# Patient Record
Sex: FEMALE | Race: WHITE | NOT HISPANIC OR LATINO | Employment: FULL TIME | URBAN - METROPOLITAN AREA
[De-identification: names, ages, dates, MRNs, and addresses within clinical notes are randomized per-mention and may not be internally consistent; named-entity substitution may affect disease eponyms.]

---

## 2017-12-28 ENCOUNTER — HOSPITAL ENCOUNTER (OUTPATIENT)
Dept: MAMMOGRAPHY | Facility: CLINIC | Age: 47
Discharge: HOME | End: 2017-12-28
Attending: OBSTETRICS & GYNECOLOGY | Admitting: OBSTETRICS & GYNECOLOGY
Payer: COMMERCIAL

## 2017-12-28 DIAGNOSIS — Z12.31 VISIT FOR SCREENING MAMMOGRAM: ICD-10-CM

## 2017-12-28 PROCEDURE — G0202 SCR MAMMO BI INCL CAD: HCPCS

## 2018-03-08 ENCOUNTER — APPOINTMENT (OUTPATIENT)
Dept: GENERAL RADIOLOGY | Facility: CLINIC | Age: 48
End: 2018-03-08
Attending: EMERGENCY MEDICINE
Payer: COMMERCIAL

## 2018-03-08 ENCOUNTER — HOSPITAL ENCOUNTER (EMERGENCY)
Facility: CLINIC | Age: 48
Discharge: HOME | End: 2018-03-08
Attending: EMERGENCY MEDICINE | Admitting: EMERGENCY MEDICINE
Payer: COMMERCIAL

## 2018-03-08 VITALS
HEART RATE: 65 BPM | HEIGHT: 68 IN | OXYGEN SATURATION: 100 % | DIASTOLIC BLOOD PRESSURE: 67 MMHG | TEMPERATURE: 97.8 F | RESPIRATION RATE: 20 BRPM | SYSTOLIC BLOOD PRESSURE: 167 MMHG | WEIGHT: 198.85 LBS | BODY MASS INDEX: 30.14 KG/M2

## 2018-03-08 DIAGNOSIS — R09.82 POST-NASAL DRIP: ICD-10-CM

## 2018-03-08 PROCEDURE — 71046 X-RAY EXAM CHEST 2 VIEWS: CPT

## 2018-03-08 PROCEDURE — 25000131 ZZH RX MED GY IP 250 OP 636 PS 637: Performed by: EMERGENCY MEDICINE

## 2018-03-08 PROCEDURE — 99283 EMERGENCY DEPT VISIT LOW MDM: CPT

## 2018-03-08 RX ORDER — CODEINE PHOSPHATE AND GUAIFENESIN 10; 100 MG/5ML; MG/5ML
1 SOLUTION ORAL EVERY 4 HOURS PRN
Qty: 120 ML | Refills: 0 | Status: SHIPPED | OUTPATIENT
Start: 2018-03-08

## 2018-03-08 RX ADMIN — DEXAMETHASONE 10 MG: 2 TABLET ORAL at 20:24

## 2018-03-08 ASSESSMENT — ENCOUNTER SYMPTOMS
SHORTNESS OF BREATH: 1
COUGH: 1
FEVER: 0
VOMITING: 0
ABDOMINAL PAIN: 0
RHINORRHEA: 1

## 2018-03-08 NOTE — ED AVS SNAPSHOT
Emergency Department    [address] [place] [address]    Phone:  [phone]    Fax:  [phone]                                       Lisa Borden   MRN: [id number]    Department:   Emergency Department   Date of Visit:  3/8/2018           After Visit Summary Signature Page     I have received my discharge instructions, and my questions have been answered. I have discussed any challenges I see with this plan with the nurse or doctor.    ..........................................................................................................................................  Patient/Patient Representative Signature      ..........................................................................................................................................  Patient Representative Print Name and Relationship to Patient    ..................................................               ................................................  Date                                            Time    ..........................................................................................................................................  Reviewed by Signature/Title    ...................................................              ..............................................  Date                                                            Time

## 2018-03-08 NOTE — ED AVS SNAPSHOT
Emergency Department    [address] [facility] [address]    Phone:  [phone]    Fax:  [phone]                                       Lisa Borden   MRN: [id number]    Department:   Emergency Department   Date of Visit:  3/8/2018           Patient Information     Date Of Birth          1970        Your diagnoses for this visit were:     Post-nasal drip        You were seen by Shiv Mary MD.      Follow-up Information     Follow up with Kelechi Wright MD In 3 days.    Specialty:  Family Practice    Contact information:    [facility]  [address]  [facility] [address]  [phone]          Follow up with  Emergency Department.    Specialty:  EMERGENCY MEDICINE    Why:  As needed, If symptoms worsen    Contact information:    [address] [facility] [address] [phone]        Discharge Instructions            * VIRAL RESPIRATORY ILLNESS w/ Wheezing [Adult]  You have an Upper Respiratory Illness (URI) caused by a virus. This illness is contagious during the first few days. It is spread through the air by coughing and sneezing or by direct contact (touching the sick person and then touching your own eyes, nose or mouth). When the infection causes a lot of irritation, the air passages can go into spasm. This causes wheezing and shortness of breath.    Most viral illnesses resolve within 7-10 days with rest and simple home remedies, although the illness may sometimes last for several weeks. Antibiotics will not kill a virus and are generally not prescribed for this condition.  HOME CARE:    If symptoms are severe, rest at home for the first 2-3 days. When resuming activity, don't let yourself become overly tired.    Avoid exposure to cigarette smoke (yours or others).    You may use acetaminophen (Tylenol) 650-1000 mg every 6 hours or ibuprofen (Motrin, Advil) 600 mg every 6-8 hours with food to control pain, if you are able to take these  medicines. [ NOTE : If you have chronic liver or kidney disease or ever had a stomach ulcer or GI bleeding, talk with your doctor before using these medicines.] (Aspirin should never be used in anyone under 18 years of age who is ill with a fever. It may cause severe liver damage.    Your appetite may be poor so a light diet is fine. Avoid dehydration by drinking 6-8 glasses of fluids per day (water, soft drinks, juices, tea, soup, etc.). Extra fluids will help loosen secretions in the nose and lungs.    Over-the-counter cold medicines will not shorten the duration of the illness, but may be helpful for the following symptoms: cough (Robitussin DM); sore throat (Chloraseptic lozenges or spray); nasal and sinus congestion (Actifed or Sudafed). [NOTE: Do not use decongestants if you have high blood pressure.]  FOLLOW UP with your doctor or as advised if you are not improving over the next week.  GET PROMPT MEDICAL ATTENTION if any of the following occur:    Cough with lots of colored sputum (mucus) or blood in your sputum    Chest pain, shortness of breath, wheezing or difficulty breathing    Severe headache; face, neck or ear pain    Fever over 101 F (38.3 C) for more than three days    Unable to swallow due to throat pain    5687-2500 The [employer]. [address] [place] [address], [place] [id number]. All rights reserved. This information is not intended as a substitute for professional medical care. Always follow your healthcare professional's instructions.  This information has been modified by your health care provider with permission from the publisher.      Opioid Medication Information    You have been given a prescription for an opioid (narcotic) pain medicine and/or have received a pain medicine while here in the Emergency Department. These medicines can make you drowsy or impaired. You must not drive, operate dangerous equipment, or engage in any other dangerous activities while taking these medications.  If you drive while taking these medications, you could be arrested for DUI, or driving under the influence. Do not drink any alcohol while you are taking these medications.   Opioid pain medications can cause addiction. If you have a history of chemical dependency of any type, you are at a higher risk of becoming addicted to pain medications.  Only take these prescribed medications to treat your pain when all other options have been tried. Take it for as short a time and as few doses as possible. Store your pain pills in a secure place, as they are frequently stolen and provide a dangerous opportunity for children or visitors in your house to start abusing these powerful medications. We will not replace any lost or stolen medicine.  As soon as your pain is better, you should flush all your remaining medication.   Many prescription pain medications contain Tylenol  (acetaminophen), including Vicodin , Tylenol #3 , Norco , Lortab , and Percocet .  You should not take any extra pills of Tylenol  if you are using these prescription medications or you can get very sick.  Do not ever take more than 4000 mg of acetaminophen in any 24 hour period.  All opioids tend to cause constipation. Drink plenty of water and eat foods that have a lot of fiber, such as fruits, vegetables, prune juice, apple juice and high fiber cereal.  Take a laxative if you don t move your bowels at least every other day. Miralax , Milk of Magnesia, Colace , or Senna  can be used to keep you regular.              24 Hour Appointment Hotline       To make an appointment at any [facility], call [phone] ([phone]). If you don't have a family doctor or clinic, we will help you find one. [facility] clinics are conveniently located to serve the needs of you and your family.             Review of your medicines      START taking        Dose / Directions Last dose taken    guaiFENesin-codeine 100-10 MG/5ML Soln solution   Commonly known as:   ROBITUSSIN AC   Dose:  1 tsp.   Quantity:  120 mL        Take 5 mLs by mouth every 4 hours as needed for cough   Refills:  0          Our records show that you are taking the medicines listed below. If these are incorrect, please call your family doctor or clinic.        Dose / Directions Last dose taken    IBUPROFEN PO        Refills:  0                Prescriptions were sent or printed at these locations (1 Prescription)                   Other Prescriptions                Printed at Department/Unit printer (1 of 1)         guaiFENesin-codeine (ROBITUSSIN AC) 100-10 MG/5ML SOLN solution                Procedures and tests performed during your visit     Chest XR,  PA & LAT      Orders Needing Specimen Collection     None      Pending Results     No orders found from 3/6/2018 to 3/9/2018.            Pending Culture Results     No orders found from 3/6/2018 to 3/9/2018.            Pending Results Instructions     If you had any lab results that were not finalized at the time of your Discharge, you can call the ED Lab Result RN at [phone]. You will be contacted by this team for any positive Lab results or changes in treatment. The nurses are available 7 days a week from 10A to 6:30P.  You can leave a message 24 hours per day and they will return your call.        Test Results From Your Hospital Stay        3/8/2018  8:09 PM      Narrative     CHEST TWO VIEWS     3/8/2018 7:53 PM     HISTORY: Cough.    COMPARISON: None.     FINDINGS: Cardiomediastinal silhouette within normal limits. No focal  airspace opacities. Biapical pleural thickening. No pleural effusion.  No pneumothorax identified. The bones are unremarkable.         Impression     IMPRESSION: No acute cardiopulmonary abnormalities.     TOVA MONTESINOS MD                Clinical Quality Measure: Blood Pressure Screening     Your blood pressure was checked while you were in the emergency department today. The last reading we obtained was  BP: 182/76 .  "Please read the guidelines below about what these numbers mean and what you should do about them.  If your systolic blood pressure (the top number) is less than 120 and your diastolic blood pressure (the bottom number) is less than 80, then your blood pressure is normal. There is nothing more that you need to do about it.  If your systolic blood pressure (the top number) is 120-139 or your diastolic blood pressure (the bottom number) is 80-89, your blood pressure may be higher than it should be. You should have your blood pressure rechecked within a year by a primary care provider.  If your systolic blood pressure (the top number) is 140 or greater or your diastolic blood pressure (the bottom number) is 90 or greater, you may have high blood pressure. High blood pressure is treatable, but if left untreated over time it can put you at risk for heart attack, stroke, or kidney failure. You should have your blood pressure rechecked by a primary care provider within the next 4 weeks.  If your provider in the emergency department today gave you specific instructions to follow-up with your doctor or provider even sooner than that, you should follow that instruction and not wait for up to 4 weeks for your follow-up visit.        Thank you for choosing [facility]       Thank you for choosing [facility] for your care. Our goal is always to provide you with excellent care. Hearing back from our patients is one way we can continue to improve our services. Please take a few minutes to complete the written survey that you may receive in the mail after you visit with us. Thank you!        [facility] Information     [employer] lets you send messages to your doctor, view your test results, renew your prescriptions, schedule appointments and more. To sign up, go to [email or web address] . Click on \"Log in\" on the left side of the screen, which will take you to the Welcome page. Then click on \"Sign up Now\" on the right side of the page. "     You will be asked to enter the access code listed below, as well as some personal information. Please follow the directions to create your username and password.     Your access code is: [email or web address]  Expires: 2018  8:13 PM     Your access code will  in 90 days. If you need help or a new code, please call your [place] clinic or [phone].        Care EveryWhere ID     This is your Care EveryWhere ID. This could be used by other organizations to access your [place] medical records  [id number]        Equal Access to Services     [facility]: Lizbeth Alonso, jeny huff, ninoska alexander, cecil sprague . So [facility] [phone].    ATENCIÓN: Si habla español, tiene a oseguera disposición servicios gratuitos de asistencia lingüística. Llame al [phone].    We comply with applicable federal civil rights laws and [place] laws. We do not discriminate on the basis of race, color, national origin, age, disability, sex, sexual orientation, or gender identity.            After Visit Summary       This is your record. Keep this with you and show to your community pharmacist(s) and doctor(s) at your next visit.

## 2018-03-09 NOTE — DISCHARGE INSTRUCTIONS
* VIRAL RESPIRATORY ILLNESS w/ Wheezing [Adult]  You have an Upper Respiratory Illness (URI) caused by a virus. This illness is contagious during the first few days. It is spread through the air by coughing and sneezing or by direct contact (touching the sick person and then touching your own eyes, nose or mouth). When the infection causes a lot of irritation, the air passages can go into spasm. This causes wheezing and shortness of breath.    Most viral illnesses resolve within 7-10 days with rest and simple home remedies, although the illness may sometimes last for several weeks. Antibiotics will not kill a virus and are generally not prescribed for this condition.  HOME CARE:    If symptoms are severe, rest at home for the first 2-3 days. When resuming activity, don't let yourself become overly tired.    Avoid exposure to cigarette smoke (yours or others).    You may use acetaminophen (Tylenol) 650-1000 mg every 6 hours or ibuprofen (Motrin, Advil) 600 mg every 6-8 hours with food to control pain, if you are able to take these medicines. [ NOTE : If you have chronic liver or kidney disease or ever had a stomach ulcer or GI bleeding, talk with your doctor before using these medicines.] (Aspirin should never be used in anyone under 18 years of age who is ill with a fever. It may cause severe liver damage.    Your appetite may be poor so a light diet is fine. Avoid dehydration by drinking 6-8 glasses of fluids per day (water, soft drinks, juices, tea, soup, etc.). Extra fluids will help loosen secretions in the nose and lungs.    Over-the-counter cold medicines will not shorten the duration of the illness, but may be helpful for the following symptoms: cough (Robitussin DM); sore throat (Chloraseptic lozenges or spray); nasal and sinus congestion (Actifed or Sudafed). [NOTE: Do not use decongestants if you have high blood pressure.]  FOLLOW UP with your doctor or as advised if you are not improving over the  next week.  GET PROMPT MEDICAL ATTENTION if any of the following occur:    Cough with lots of colored sputum (mucus) or blood in your sputum    Chest pain, shortness of breath, wheezing or difficulty breathing    Severe headache; face, neck or ear pain    Fever over 101 F (38.3 C) for more than three days    Unable to swallow due to throat pain    5790-3601 [employer]. [address] [place] [address]. All rights reserved. This information is not intended as a substitute for professional medical care. Always follow your healthcare professional's instructions.  This information has been modified by your health care provider with permission from the publisher.      Opioid Medication Information    You have been given a prescription for an opioid (narcotic) pain medicine and/or have received a pain medicine while here in the Emergency Department. These medicines can make you drowsy or impaired. You must not drive, operate dangerous equipment, or engage in any other dangerous activities while taking these medications. If you drive while taking these medications, you could be arrested for DUI, or driving under the influence. Do not drink any alcohol while you are taking these medications.   Opioid pain medications can cause addiction. If you have a history of chemical dependency of any type, you are at a higher risk of becoming addicted to pain medications.  Only take these prescribed medications to treat your pain when all other options have been tried. Take it for as short a time and as few doses as possible. Store your pain pills in a secure place, as they are frequently stolen and provide a dangerous opportunity for children or visitors in your house to start abusing these powerful medications. We will not replace any lost or stolen medicine.  As soon as your pain is better, you should flush all your remaining medication.   Many prescription pain medications contain Tylenol  (acetaminophen),  including Vicodin , Tylenol #3 , Norco , Lortab , and Percocet .  You should not take any extra pills of Tylenol  if you are using these prescription medications or you can get very sick.  Do not ever take more than 4000 mg of acetaminophen in any 24 hour period.  All opioids tend to cause constipation. Drink plenty of water and eat foods that have a lot of fiber, such as fruits, vegetables, prune juice, apple juice and high fiber cereal.  Take a laxative if you don t move your bowels at least every other day. Miralax , Milk of Magnesia, Colace , or Senna  can be used to keep you regular.

## 2018-03-09 NOTE — ED PROVIDER NOTES
"  History     Chief Complaint:  Cough    HPI   Lisa Borden is a 47 year old female who presents with concerns for cough. Since October of 2017, the patient has experienced productive cough, congestion, runny nose and sneezing. She has been following with a doctor at the [facility] clinic and has tried antibiotics which are successful for a short while though her symptoms return. She has tried nebulizer, DayQuil, afrin and other over the counter medicines which have been ineffective. She comes in tonight in anticipation for a trip to [place] next week. She denies vomiting, diarrhea, allergies, medications or other medical problems. She has had cholecystectomy, tubal ligation, and leg surgery.     Allergies:  No Known Drug Allergies    Medications:    ibuprofen    Past Medical History:    The patient denies any relevant past medical history.    Past Surgical History:    Cholecystectomy  Tubal ligation    Family History:    The patient denies any relevant family medical history.    Social History:  The patient was accompanied to the ED by her .  Smoking Status: never  Smokeless Tobacco: never  Alcohol Use: 3 days a week    Marital Status:   [2]    Review of Systems   Constitutional: Negative for fever.   HENT: Positive for congestion, postnasal drip and rhinorrhea.    Respiratory: Positive for cough and shortness of breath.    Gastrointestinal: Negative for abdominal pain and vomiting.   All other systems reviewed and are negative.      Physical Exam   First Vitals:  BP: 182/76  Pulse: 65  Heart Rate: 75  Temp: 97.8  F (36.6  C)  Resp: 18  Height: 172.7 cm (5' 8\")  Weight: 90.2 kg (198 lb 13.7 oz)  SpO2: 100 %      Physical Exam  General: Appears well-developed and well-nourished.   Head: No signs of trauma.   Mouth/Throat: Oropharynx is clear and moist.   Neck: Normal range of motion.   CV: Normal rate and regular rhythm.    Resp: coughing with deep inspiration and scattered wheezing. No respiratory " distress.   GI: Soft. There is no tenderness.  No rebound or guarding.  Normal bowel sounds.   MSK: Normal range of motion.   Neuro: The patient is alert and oriented.  Speech normal.  GCS 15  Skin: Skin is warm and dry. No rash noted.   Psych: normal mood and affect. behavior is normal.       Emergency Department Course   Imaging:  Radiographic findings were communicated with the patient who voiced understanding of the findings.  XR Chest, PA & LAT:  No acute cardiopulmonary abnormalities. As per radiology.     Interventions:  2024 Decadron, 10 mg, Oral    Emergency Department Course:  Nursing notes and vitals reviewed. I performed an exam of the patient as documented above.     Medicine administered as documented above.    The patient was sent for a chest X-ray while in the emergency department, findings above.     2012 I rechecked the patient and discussed the results of her workup thus far.     Findings and plan explained to the Patient. Patient discharged home with instructions regarding supportive care, medications, and reasons to return. The importance of close follow-up was reviewed. The patient was prescribed Robitussin.     I personally reviewed the imaging results with the Patient and answered all related questions prior to discharge.     Impression & Plan    Medical Decision Making:  Lisa Borden is a 47-year-old woman who presents due to a cough.  States that she has had a cough for the last few months.  She has tried antibiotics which would help briefly but then the symptoms would return.  She comes in tonight mostly because she is going out of town in a week and wants to get it under control before this.  On my evaluation, she did have some slight wheezing but no respiratory distress, her lungs are otherwise clear.  I did obtain a chest x-ray which did not show any signs of pneumonia.  Given the length of her symptoms and the description of her symptoms with the congestion, I believe that her cough  is related to postnasal drip.  I recommend supportive care measures with Sudafed, Claritin, Mucinex.  She has also been using Afrin a fair amount, and I discussed with her that this may be causing rebound congestion and exacerbating the symptoms.  I recommend that she stop using Afrin.  She is recommended follow with her primary care doctor and return to the ER for any further concerns.      Diagnosis:    ICD-10-CM    1. Post-nasal drip R09.82        Disposition:  discharged to home    Discharge Medications:  Discharge Medication List as of 3/8/2018  8:13 PM      START taking these medications    Details   guaiFENesin-codeine (ROBITUSSIN AC) 100-10 MG/5ML SOLN solution Take 5 mLs by mouth every 4 hours as needed for cough, Disp-120 mL, R-0, Local Print           I, Paxton Dudley, am serving as a scribe on 3/8/2018 at 7:41 PM to personally document services performed by Shiv Mary MD based on my observations and the provider's statements to me.       Paxton Dudley  3/8/2018    EMERGENCY DEPARTMENT       Shiv Mary MD  03/08/18 [id number]

## 2019-01-04 ENCOUNTER — HOSPITAL ENCOUNTER (OUTPATIENT)
Dept: MAMMOGRAPHY | Facility: CLINIC | Age: 49
Discharge: HOME | End: 2019-01-04
Payer: COMMERCIAL

## 2019-01-04 DIAGNOSIS — Z12.31 VISIT FOR SCREENING MAMMOGRAM: ICD-10-CM

## 2019-01-04 PROCEDURE — 77063 BREAST TOMOSYNTHESIS BI: CPT

## 2019-07-15 ENCOUNTER — HOSPITAL ENCOUNTER (OUTPATIENT)
Facility: CLINIC | Age: 49
End: 2019-07-15
Attending: COLON & RECTAL SURGERY | Admitting: COLON & RECTAL SURGERY
Payer: COMMERCIAL

## 2019-12-27 ENCOUNTER — HOSPITAL ENCOUNTER (OUTPATIENT)
Dept: MAMMOGRAPHY | Facility: CLINIC | Age: 49
Discharge: HOME | End: 2019-12-27
Attending: OBSTETRICS & GYNECOLOGY | Admitting: OBSTETRICS & GYNECOLOGY
Payer: COMMERCIAL

## 2019-12-27 DIAGNOSIS — Z12.31 VISIT FOR SCREENING MAMMOGRAM: ICD-10-CM

## 2019-12-27 PROCEDURE — 77063 BREAST TOMOSYNTHESIS BI: CPT

## 2020-12-31 ENCOUNTER — HOSPITAL ENCOUNTER (OUTPATIENT)
Dept: MAMMOGRAPHY | Facility: CLINIC | Age: 50
Discharge: HOME | End: 2020-12-31
Attending: FAMILY MEDICINE | Admitting: FAMILY MEDICINE
Payer: COMMERCIAL

## 2020-12-31 DIAGNOSIS — Z12.31 VISIT FOR SCREENING MAMMOGRAM: ICD-10-CM

## 2020-12-31 PROCEDURE — 77063 BREAST TOMOSYNTHESIS BI: CPT

## 2021-10-22 ENCOUNTER — OFFICE VISIT (OUTPATIENT)
Dept: VASCULAR SURGERY | Facility: CLINIC | Age: 51
End: 2021-10-22
Payer: COMMERCIAL

## 2021-10-22 DIAGNOSIS — I83.812 VARICOSE VEINS OF LEFT LOWER EXTREMITY WITH PAIN: Primary | ICD-10-CM

## 2021-10-22 PROCEDURE — 99203 OFFICE O/P NEW LOW 30 MIN: CPT | Performed by: SURGERY

## 2021-10-22 RX ORDER — TRAZODONE HYDROCHLORIDE 100 MG/1
100 TABLET ORAL
COMMUNITY
Start: 2021-05-13

## 2021-10-22 NOTE — LETTER
10/22/2021         RE: Lisa Borden  [address]  [facility] [address]        Dear Colleague,    Thank you for referring your patient, Lisa Borden, to the [facility] VEIN CLINIC [place]. Please see a copy of my visit note below.    VEINSOLUTIONS CONSULTATION    HPI:    Lisa Borden is a pleasant 50 year old female who presents with recurrent left  greater than right lower extremity pain and primary left lower extremity varicose veins.  She underwent vein surgery in approximately 2009 with my partner, Dr. Lokesh Littlejohn with a surgical stripping.  Unfortunately, last 2 years, she has had recurrence of leg pain worse on the left leg down the right with varicose veins.  Her pain is located primarily in the thigh and in the proximal lateral left leg, is described as an aching, tiredness and heaviness, worse after is been on her feet for long periods of time and improves with elevation of leg and ibuprofen on an as-needed basis.    Symptoms interfere with actives of daily living making it difficult to be on her feet for long hours as she runs a .  The achiness sometimes can keep her awake at night.    She has no history of deep vein thrombosis, superficial thrombophlebitis or hemorrhage.    Family history is significant for varicose veins.    PAST MEDICAL HISTORY: No past medical history on file.    PAST SURGICAL HISTORY:   Past Surgical History:   Procedure Laterality Date     CHOLECYSTECTOMY       GYN SURGERY      tubal        FAMILY HISTORY: No family history on file.    SOCIAL HISTORY:   Social History     Tobacco Use     Smoking status: Never Smoker     Smokeless tobacco: Never Used   Substance Use Topics     Alcohol use: Yes     Comment: 3 days a week       REVIEW OF SYSTEMS: Review Of Systems  Skin: negative  Eyes: negative  Ears/Nose/Throat: negative  Respiratory: No shortness of breath, dyspnea on exertion, cough, or hemoptysis  Cardiovascular:  negative  Gastrointestinal: Poor appetite  Genitourinary: negative  Musculoskeletal: negative  Neurologic: headaches  Psychiatric: Depression, anxiety  Hematologic/Lymphatic/Immunologic: negative  Endocrine: negative      Vital signs:  There were no vitals taken for this visit.    Current Outpatient Medications   Medication Sig Dispense Refill     traZODone (DESYREL) 100 MG tablet Take 100 mg by mouth       guaiFENesin-codeine (ROBITUSSIN AC) 100-10 MG/5ML SOLN solution Take 5 mLs by mouth every 4 hours as needed for cough 120 mL 0     IBUPROFEN PO          PHYSICAL EXAM:  General: Pleasant, NAD.   HEENT: Normocephalic, atraumatic, external ears and nose normal.   Respiratory: Normal respiratory effort.   Cardiovascular: Pulse is regular.   Musculoskeletal: Gait and station normal.  The joints of her fingers and toes without deformity.  There is no cyanosis of her nailbeds.   EXTREMITIES: Right lower extremity: No significant varicose veins.  Scattered telangiectasias over the medial and posterior medial right calf, distal lateral right leg.  No ankle edema.    Left lower extremity: 4 mm varicosities coursing from the posterior medial mid right left thigh, posteriorly and laterally down the lateral aspect of the left thigh to the lateral left leg and terminating in a cluster of telangiectasias.  No significant ankle edema or stasis hyperpigmentation..    PULSES: R/L (3=normal pulse, 0=no palpable pulse) dorsalis pedis: 3/3; posterior tibial: 3/3.      Neurologic: Grossly normal  Psychiatric: Mood, affect, judgment and insight are normal     ASSESSMENT:  Bilateral extremity pain with recurrent left lower extremity varicose veins.  We discussed the anatomy of the lower extremity veins and the pathophysiology of venous insufficiency.  The option of continued conservative measures with use of compression hose, exercise, dietary measures and leg elevation was discussed.  Risk of conservative management including  superficial thrombophlebitis, bleeding and progression of the disease process were discussed.    She feels that her symptoms are interfering with actives of daily living as she wished to have this further evaluated.  We briefly discussed treatment options for axial incompetence using endovenous ablation and options of either sclerotherapy or phlebectomy for treating branch tributaries.  Risks of surgery including bleeding, infection, nerve injury, scarring, hyperpigmentation, deep vein thrombosis, recanalization of the ablated veins and recurrent varicose veins were discussed.    We briefly discussed details of cosmetic sclerotherapy for the telangiectasias with risks of allergic reaction, deep vein thrombosis, ulceration, hyperpigmentation and matting.    PLAN:  Left lower extremity venous competency study with video visit to discuss results     Terry García MD    Dictated using Dragon voice recognition software which may result in transcription errors          VEINSOLUTIONS NEW PATIENT:                  Again, thank you for allowing me to participate in the care of your patient.        Sincerely,        Terry García MD

## 2021-10-22 NOTE — PROGRESS NOTES
VEINSOLUTIONS CONSULTATION    HPI:    Lisa Borden is a pleasant 50 year old female who presents with recurrent left  greater than right lower extremity pain and primary left lower extremity varicose veins.  She underwent vein surgery in approximately 2009 with my partner, Dr. Lokesh Littlejohn with a surgical stripping.  Unfortunately, last 2 years, she has had recurrence of leg pain worse on the left leg down the right with varicose veins.  Her pain is located primarily in the thigh and in the proximal lateral left leg, is described as an aching, tiredness and heaviness, worse after is been on her feet for long periods of time and improves with elevation of leg and ibuprofen on an as-needed basis.    Symptoms interfere with actives of daily living making it difficult to be on her feet for long hours as she runs a .  The achiness sometimes can keep her awake at night.    She has no history of deep vein thrombosis, superficial thrombophlebitis or hemorrhage.    Family history is significant for varicose veins.    PAST MEDICAL HISTORY: No past medical history on file.    PAST SURGICAL HISTORY:   Past Surgical History:   Procedure Laterality Date     CHOLECYSTECTOMY       GYN SURGERY      tubal        FAMILY HISTORY: No family history on file.    SOCIAL HISTORY:   Social History     Tobacco Use     Smoking status: Never Smoker     Smokeless tobacco: Never Used   Substance Use Topics     Alcohol use: Yes     Comment: 3 days a week       REVIEW OF SYSTEMS: Review Of Systems  Skin: negative  Eyes: negative  Ears/Nose/Throat: negative  Respiratory: No shortness of breath, dyspnea on exertion, cough, or hemoptysis  Cardiovascular: negative  Gastrointestinal: Poor appetite  Genitourinary: negative  Musculoskeletal: negative  Neurologic: headaches  Psychiatric: Depression, anxiety  Hematologic/Lymphatic/Immunologic: negative  Endocrine: negative      Vital signs:  There were no vitals taken for this visit.    Current  Outpatient Medications   Medication Sig Dispense Refill     traZODone (DESYREL) 100 MG tablet Take 100 mg by mouth       guaiFENesin-codeine (ROBITUSSIN AC) 100-10 MG/5ML SOLN solution Take 5 mLs by mouth every 4 hours as needed for cough 120 mL 0     IBUPROFEN PO          PHYSICAL EXAM:  General: Pleasant, NAD.   HEENT: Normocephalic, atraumatic, external ears and nose normal.   Respiratory: Normal respiratory effort.   Cardiovascular: Pulse is regular.   Musculoskeletal: Gait and station normal.  The joints of her fingers and toes without deformity.  There is no cyanosis of her nailbeds.   EXTREMITIES: Right lower extremity: No significant varicose veins.  Scattered telangiectasias over the medial and posterior medial right calf, distal lateral right leg.  No ankle edema.    Left lower extremity: 4 mm varicosities coursing from the posterior medial mid right left thigh, posteriorly and laterally down the lateral aspect of the left thigh to the lateral left leg and terminating in a cluster of telangiectasias.  No significant ankle edema or stasis hyperpigmentation..    PULSES: R/L (3=normal pulse, 0=no palpable pulse) dorsalis pedis: 3/3; posterior tibial: 3/3.      Neurologic: Grossly normal  Psychiatric: Mood, affect, judgment and insight are normal     ASSESSMENT:  Bilateral extremity pain with recurrent left lower extremity varicose veins.  We discussed the anatomy of the lower extremity veins and the pathophysiology of venous insufficiency.  The option of continued conservative measures with use of compression hose, exercise, dietary measures and leg elevation was discussed.  Risk of conservative management including superficial thrombophlebitis, bleeding and progression of the disease process were discussed.    She feels that her symptoms are interfering with actives of daily living as she wished to have this further evaluated.  We briefly discussed treatment options for axial incompetence using endovenous  ablation and options of either sclerotherapy or phlebectomy for treating branch tributaries.  Risks of surgery including bleeding, infection, nerve injury, scarring, hyperpigmentation, deep vein thrombosis, recanalization of the ablated veins and recurrent varicose veins were discussed.    We briefly discussed details of cosmetic sclerotherapy for the telangiectasias with risks of allergic reaction, deep vein thrombosis, ulceration, hyperpigmentation and matting.    PLAN:  Left lower extremity venous competency study with video visit to discuss results     Terry García MD    Dictated using Dragon voice recognition software which may result in transcription errors          VEINSOLUTIONS NEW PATIENT:

## 2021-11-24 ENCOUNTER — ANCILLARY PROCEDURE (OUTPATIENT)
Dept: ULTRASOUND IMAGING | Facility: CLINIC | Age: 51
End: 2021-11-24
Attending: SURGERY
Payer: COMMERCIAL

## 2021-11-24 DIAGNOSIS — I83.812 VARICOSE VEINS OF LEFT LOWER EXTREMITY WITH PAIN: ICD-10-CM

## 2021-11-24 PROCEDURE — 93970 EXTREMITY STUDY: CPT | Mod: 26 | Performed by: SPECIALIST

## 2021-11-29 ENCOUNTER — VIRTUAL VISIT (OUTPATIENT)
Dept: VASCULAR SURGERY | Facility: CLINIC | Age: 51
End: 2021-11-29
Attending: SURGERY
Payer: COMMERCIAL

## 2021-11-29 DIAGNOSIS — I83.811 VARICOSE VEINS OF RIGHT LOWER EXTREMITY WITH PAIN: ICD-10-CM

## 2021-11-29 DIAGNOSIS — I83.812 VARICOSE VEINS OF LEFT LOWER EXTREMITY WITH PAIN: Primary | ICD-10-CM

## 2021-11-29 PROCEDURE — 99213 OFFICE O/P EST LOW 20 MIN: CPT | Mod: GT | Performed by: SURGERY

## 2021-11-29 NOTE — PROGRESS NOTES
After Visit Follow Up  Per Dr. García, patient was recommended to have 2 - 4 sessions at $400 of cosmetic sclerotherapy.    Reviewed with and gave to patient our vein clinic sclerotherapy packet of information which includes basic sclerotherapy information, pre-treatment instructions and post-treatment instructions.    Patient in agreement with plan and had no further questions.    Maren Landa MA on 11/29/2021 at 3:44 PM

## 2021-11-29 NOTE — LETTER
11/29/2021         RE: Lisa Borden  [address] [facility] [address]        Dear Colleague,    Thank you for referring your patient, Lisa Borden, to the [facility] VEIN [facility]. Please see a copy of my visit note below.     is a 51 year old who is being evaluated via a billable video visit.      How would you like to obtain your AVS? MyChart  If the video visit is dropped, the invitation should be resent by: Text to cell phone: [phone]  Will anyone else be joining your video visit? No      Video Start Time: 3:31 PM      Video-Visit Details    Type of service:  Video Visit    Video End Time:3:41 PM    Originating Location (pt. Location): Home    Distant Location (provider location):  [facility] VEIN [facility]     Platform used for Video Visit: Accountable     [facility] Vein [facility] Progress Note    Lisa Borden presents in follow-up of recurrent, bilateral lower extremity varicose veins with pain..  See my consultation of 10/22/2021 for details.  She returned on 11/24/2021 for bilateral extremity venous competency studies, the results of which were discussed on today's video visit.      Physical Exam  General: Pleasant female in no acute distress.  Blood pressure 118/81, pulse 72  Extremities: Right lower extremity: 3 to 4 mm varicosities over the right medial thigh.  Few scattered reticular veins on the lower leg.  No significant edema or stasis changes.  Left lower extremity: 3-4 mm varicosity coursing down the lateral left thigh extending across the popliteal fossa onto the lateral left leg.  Ultrasound  Right lower extremity: No evidence of deep vein thrombosis.  The right common femoral vein is incompetent but the remaining deep vein valves are competent.  The right great saphenous vein is absent in the thigh but patent and incompetent in the proximal calf and incompetent from the mid to distal calf.  The small  saphenous vein is chronically occluded from the proximal to the distal calf.  The Vein of Giacomini is competent.  The right anterior accessory saphenous vein is absent    There is an incompetent vein branch coursing from near the saphenofemoral junction to the proximal calf measuring 4.1 mm in diameter with reflux time of 2.9 seconds.    Left lower extremity: No evidence of deep vein thrombosis or deep vein valve incompetence.  The left great saphenous vein is absent.  The left small saphenous vein is chronically closed from the proximal to the mid calf but is patent and competent distally.  The Vein of Giacomini is competent.  The left anterior accessory saphenous vein is incompetent, measures 3 mm in diameter with reflux time of 5.1 seconds.  Is a source of an incompetent vein branch measuring 5 mm in diameter with reflux time of 3.5 seconds.  There is a 5.1 mm diameter incompetent vein branch coursing down the lateral left thigh across the popliteal crease with reflux time of 6.7 seconds    Assessment:  Recurrent bilateral lower extremity varicose veins with pain.  We discussed options of conservative measures with risks of superficial thrombophlebitis, bleeding and progression of disease process.    If she chose treatment, she be a candidate for endovenous ablation of the left anterior accessory saphenous vein.  The tributaries coursing from the left anterior accessory saphenous vein down the thigh could be treated with either phlebectomies or ultrasound-guided sclerotherapy.  We discussed details of the procedure including risks of bleeding, infection, nerve injury, scarring, hyperpigmentation, deep vein thrombosis, recanalization of the anterior accessory saphenous vein and recurrent varicose veins.  Risk of sclerotherapy including allergic reaction, deep vein thrombosis, superficial thrombophlebitis and hyperpigmentation were discussed.    I believe the varicosity coursing down the right medial thigh from  near the saphenofemoral junction would be best addressed with ultrasound-guided sclerotherapy as well.    Plan:  Radiofrequency ablation left anterior accessory saphenous vein with sclerotherapy of incompetent vein branches bilateral lower extremities at same setting.    Terry García MD    Dictated using Dragon voice recognition software which may result in transcription errors          After Visit Follow Up  Per Dr. García, patient was recommended to have 2 - 4 sessions at $400 of cosmetic sclerotherapy.    Reviewed with and gave to patient our vein clinic sclerotherapy packet of information which includes basic sclerotherapy information, pre-treatment instructions and post-treatment instructions.    Patient in agreement with plan and had no further questions.    Maren Landa MA on 11/29/2021 at 3:44 PM      Again, thank you for allowing me to participate in the care of your patient.        Sincerely,        Terry García MD

## 2021-11-29 NOTE — PROGRESS NOTES
is a 51 year old who is being evaluated via a billable video visit.      How would you like to obtain your AVS? MyChart  If the video visit is dropped, the invitation should be resent by: Text to cell phone: [phone]  Will anyone else be joining your video visit? No      Video Start Time: 3:31 PM      Video-Visit Details    Type of service:  Video Visit    Video End Time:3:41 PM    Originating Location (pt. Location): Home    Distant Location (provider location):  [facility] VEIN [facility]     Platform used for Video Visit: Personally     [facility] Vein Clinic [place] Progress Note    Lisa Borden presents in follow-up of recurrent, bilateral lower extremity varicose veins with pain..  See my consultation of 10/22/2021 for details.  She returned on 11/24/2021 for bilateral extremity venous competency studies, the results of which were discussed on today's video visit.      Physical Exam  General: Pleasant female in no acute distress.  Blood pressure 118/81, pulse 72  Extremities: Right lower extremity: 3 to 4 mm varicosities over the right medial thigh.  Few scattered reticular veins on the lower leg.  No significant edema or stasis changes.  Left lower extremity: 3-4 mm varicosity coursing down the lateral left thigh extending across the popliteal fossa onto the lateral left leg.  Ultrasound  Right lower extremity: No evidence of deep vein thrombosis.  The right common femoral vein is incompetent but the remaining deep vein valves are competent.  The right great saphenous vein is absent in the thigh but patent and incompetent in the proximal calf and incompetent from the mid to distal calf.  The small saphenous vein is chronically occluded from the proximal to the distal calf.  The Vein of Giacomini is competent.  The right anterior accessory saphenous vein is absent    There is an incompetent vein branch coursing from near the saphenofemoral junction to the proximal calf  measuring 4.1 mm in diameter with reflux time of 2.9 seconds.    Left lower extremity: No evidence of deep vein thrombosis or deep vein valve incompetence.  The left great saphenous vein is absent.  The left small saphenous vein is chronically closed from the proximal to the mid calf but is patent and competent distally.  The Vein of Giacomini is competent.  The left anterior accessory saphenous vein is incompetent, measures 3 mm in diameter with reflux time of 5.1 seconds.  Is a source of an incompetent vein branch measuring 5 mm in diameter with reflux time of 3.5 seconds.  There is a 5.1 mm diameter incompetent vein branch coursing down the lateral left thigh across the popliteal crease with reflux time of 6.7 seconds    Assessment:  Recurrent bilateral lower extremity varicose veins with pain.  We discussed options of conservative measures with risks of superficial thrombophlebitis, bleeding and progression of disease process.    If she chose treatment, she be a candidate for endovenous ablation of the left anterior accessory saphenous vein.  The tributaries coursing from the left anterior accessory saphenous vein down the thigh could be treated with either phlebectomies or ultrasound-guided sclerotherapy.  We discussed details of the procedure including risks of bleeding, infection, nerve injury, scarring, hyperpigmentation, deep vein thrombosis, recanalization of the anterior accessory saphenous vein and recurrent varicose veins.  Risk of sclerotherapy including allergic reaction, deep vein thrombosis, superficial thrombophlebitis and hyperpigmentation were discussed.    I believe the varicosity coursing down the right medial thigh from near the saphenofemoral junction would be best addressed with ultrasound-guided sclerotherapy as well.    Plan:  Radiofrequency ablation left anterior accessory saphenous vein with sclerotherapy of incompetent vein branches bilateral lower extremities at same  setting.    Terry García MD    Dictated using Dragon voice recognition software which may result in transcription errors

## 2021-12-03 ENCOUNTER — TELEPHONE (OUTPATIENT)
Dept: VASCULAR SURGERY | Facility: CLINIC | Age: 51
End: 2021-12-03
Payer: COMMERCIAL

## 2021-12-03 NOTE — TELEPHONE ENCOUNTER
Called and spoke with patient regarding process of insurance submission. Informed patient this process could take up to 14 business days, but once approved, you will be contacted to schedule your procedure.    Further documentation of this process will be documented in the referral.    Veena Lima  [facility]

## 2021-12-13 DIAGNOSIS — I83.812 VARICOSE VEINS OF LEFT LOWER EXTREMITY WITH PAIN: Primary | ICD-10-CM

## 2021-12-20 ENCOUNTER — TELEPHONE (OUTPATIENT)
Dept: VASCULAR SURGERY | Facility: CLINIC | Age: 51
End: 2021-12-20
Payer: COMMERCIAL

## 2021-12-20 DIAGNOSIS — I83.812 VARICOSE VEINS OF LEFT LOWER EXTREMITY WITH PAIN: Primary | ICD-10-CM

## 2021-12-20 RX ORDER — LORAZEPAM 1 MG/1
TABLET ORAL
Qty: 3 TABLET | Refills: 0 | Status: SHIPPED | OUTPATIENT
Start: 2021-12-20 | End: 2022-04-04

## 2021-12-20 RX ORDER — CLONIDINE HYDROCHLORIDE 0.1 MG/1
TABLET ORAL
Qty: 1 TABLET | Refills: 0 | Status: SHIPPED | OUTPATIENT
Start: 2021-12-20 | End: 2022-04-04

## 2021-12-20 NOTE — TELEPHONE ENCOUNTER
Vein Clinic Preoperative Nurse Call    Procedure: Left leg VNUS closure ASV(med nec), u/s sclero (med nec) 1/2 sessions (exp. 11/30/22), sclero ($)  Date: Tuesday 12/28  Surgeon: Dr. 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 Informed patient: when to check in (0930) to sign consent, to bring their preop medications in their original bottle with them (3mg ativan, 0.1mg clonidine). Patient will take the medications after signing the consent to the procedure. Instructed patient to wear a mask, wear loose-fitting comfortable clothing, and bring their compression hose. Ensured patient has a /someone that will be responsible for them the rest of the day. Visitors are allowed in the clinic, but they would need to stay in an exam room or wait in the parking lot or leave. If  does leave, IF POSSIBLE, we ask that they do not go more than 15-20 mins from our clinic. Once procedure is completed, we will keep patient in recovery for 30-45 mins, and call  with aftercare instructions. Informed patient, that if possible, they should sit in the backseat to elevate their leg on the ride home.    Pt needs thigh-high compression hose for procedure. Status of the hose: asked pt if she has a pair of thigh high hose yet or if she needs us to order a pair for her.    Special COVID-19 instructions: Patient aware they need to wear a mask to our clinic and during their procedure. Patient aware that their  can come in with them, but would need to stay in an exam room during the procedure or wait in the parking lot or leave. Nurse will call pt's  when procedure is over.    Patient understands that if they have any of the following symptoms (fever, cough, shortness of breath, rash), they need to notify us immediately to cancel their procedure and will have to reschedule for a later  date.    Patient to give us a call back if she has any questions or concerns.    Mercedes Diamond RN  12/20/2021

## 2021-12-23 ENCOUNTER — IMMUNIZATION (OUTPATIENT)
Dept: NURSING | Facility: CLINIC | Age: 51
End: 2021-12-23
Payer: COMMERCIAL

## 2021-12-23 DIAGNOSIS — Z23 HIGH PRIORITY FOR 2019-NCOV VACCINE: Primary | ICD-10-CM

## 2021-12-23 PROCEDURE — 99207 PR NO CHARGE NURSE ONLY: CPT

## 2021-12-23 PROCEDURE — 91306 COVID-19,PF,MODERNA (18+ YRS BOOSTER .25ML): CPT

## 2021-12-23 PROCEDURE — 0064A COVID-19,PF,MODERNA (18+ YRS BOOSTER .25ML): CPT

## 2021-12-23 NOTE — TELEPHONE ENCOUNTER
After Visit Follow Up  Per pt request, faxed their compression hose Rx to [facility] at [phone].   Pt would like 1 pair(s) of beige, open-toe, thigh high, 20-30mmhg compression hose. Fax confirmed.    Pt aware they will be shipped to their home address.    Matt Bonilla RN

## 2021-12-27 NOTE — TELEPHONE ENCOUNTER
Per [facility] unable to ship in time due to the holiday and a pair if available for  at the [facility] in [place] for patient to ;

## 2021-12-28 RX ORDER — LORAZEPAM 1 MG/1
TABLET ORAL
Refills: 0 | Status: CANCELLED | OUTPATIENT
Start: 2021-12-28

## 2021-12-28 NOTE — TELEPHONE ENCOUNTER
Pt had to cancel her procedure d/t having covid. Pt rescheduled her procedure for March 2022.    Pt did  her compression hose from the [facility] store so she has those ready to go.    Pt stated her preop medications did not go to her preferred pharmacy, but the other [facility] pharmacy in [place]. Pt prefers the [facility] on [address]. Pt was able to get her clonidine Rx transferred to the her preferred pharmacy, but the ativan cannot be transferred as it is a controlled substance. Plan: RN to send Ativan Rx 1 week prior to her procedure in March.    Called the [facility] on [address] and told them to put pt's Rx's back.    Mercedes Diamond RN  [facility]  Vein Clinic

## 2021-12-30 ENCOUNTER — TELEPHONE (OUTPATIENT)
Dept: VASCULAR SURGERY | Facility: CLINIC | Age: 51
End: 2021-12-30
Payer: COMMERCIAL

## 2022-01-17 ENCOUNTER — HOSPITAL ENCOUNTER (OUTPATIENT)
Dept: MAMMOGRAPHY | Facility: CLINIC | Age: 52
Discharge: HOME | End: 2022-01-17
Attending: FAMILY MEDICINE | Admitting: FAMILY MEDICINE
Payer: COMMERCIAL

## 2022-01-17 DIAGNOSIS — Z12.31 BREAST CANCER SCREENING BY MAMMOGRAM: ICD-10-CM

## 2022-01-17 PROCEDURE — 77067 SCR MAMMO BI INCL CAD: CPT

## 2022-03-07 DIAGNOSIS — I83.812 VARICOSE VEINS OF LEFT LOWER EXTREMITY WITH PAIN: Primary | ICD-10-CM

## 2022-03-13 RX ORDER — LORAZEPAM 1 MG/1
TABLET ORAL
Qty: 3 TABLET | Refills: 0 | Status: SHIPPED | OUTPATIENT
Start: 2022-03-13 | End: 2022-04-04

## 2022-03-21 ENCOUNTER — TELEPHONE (OUTPATIENT)
Dept: VASCULAR SURGERY | Facility: CLINIC | Age: 52
End: 2022-03-21
Payer: COMMERCIAL

## 2022-03-21 NOTE — TELEPHONE ENCOUNTER
Pt gave verbal consent to be in video site visit for accrediation.    Veena Lima, Surgery Scheduler  [facility] Vein [facility]

## 2022-03-23 ENCOUNTER — TELEPHONE (OUTPATIENT)
Dept: VASCULAR SURGERY | Facility: CLINIC | Age: 52
End: 2022-03-23
Payer: COMMERCIAL

## 2022-03-23 NOTE — TELEPHONE ENCOUNTER
"Called patient back regarding question about  using \"red light\" on her legs at a tanning salon after procedure to help \"heal\" them.  Discussed with patient that the incisions for this procedure will be small and  generally no suture is used to close the incision. Generally people heal well without any concerns.   She states she understands and will not be using the \"red light\"   "

## 2022-03-23 NOTE — TELEPHONE ENCOUNTER
Vein Clinic Preoperative Nurse Call    Procedure: Left leg VNUS closure ASV(med nec), u/s sclero (med nec) 1/2 sessions (exp. 11/30/22), sclero ($)  Date: 4/1/2022  Surgeon: Ricky  Time: 1330  Check in time: 1230    Called and spoke to patient. Informed patient: when to check in (1230) to sign consent, to bring their preop medications in their original bottle with them (3mg ativan, 0.1mg clonidine). Patient will take the medications after signing the consent to the procedure. Instructed patient to wear a mask, wear loose-fitting comfortable clothing, and bring their compression hose. Ensured patient has a /someone that will be responsible for them the rest of the day. Visitors are allowed in the clinic, but they would need to stay in an exam room or wait in the parking lot or leave. If  does leave, IF POSSIBLE, we ask that they do not go more than 15-20 mins from our clinic. Once procedure is completed, we will keep patient in recovery for 30-45 mins, and call  with aftercare instructions. Informed patient, that if possible, they should sit in the backseat to elevate their leg on the ride home.    Pt needs thigh-high compression hose for procedure. Status of the hose: patient has hose and bringing with the day of procedure.     Special COVID-19 instructions: Patient aware they need to wear a mask to our clinic and during their procedure. Patient aware that their  can come in with them, but would need to stay in an exam room during the procedure or wait in the parking lot or leave. Nurse will call pt's  when procedure is over.    Patient understands that if they have any of the following symptoms (fever, cough, shortness of breath, rash), they need to notify us immediately to cancel their procedure and will have to reschedule for a later date.    Patient is in agreement with preoperative information and has no further questions.    Veena Andrea RN  3/23/2022

## 2022-03-28 NOTE — PATIENT INSTRUCTIONS
Post-Procedure Instructions:                          VNUS Closure    Post-Op Day Zero - The Day of Your Procedure:  1. Medication for Pain Control and Inflammation Control   - The numbing medication injected during your procedure will last for several hours. The pre-procedure    tablets may make you very sleepy and you might not remember everything from the procedure or from    the day. This will usually wear off by the next day.   - Ibuprofen:  If tolerated, take ibuprofen (e.g., Advil) to reduce inflammation whether or not you have    pain. For three days, take two tablets (200mg each) with every meal and at bedtime with a snack. If    you are not able to take Ibuprofen, Tylenol is another option.   - You may resume taking any medications you were taking before your procedure.  2. Activity   You may be up as tolerated, when the sedation wears off. Elevate if possible when not walking.  3. Bandages   - You will have one or more small bandages covering the incision site(s) where we accessed the vein(s).    Keep your bandages on and dry for 48 hours. Compression hose should be worn continuously over    the bandages for the first 24 hours.  4. Incisions   - Bleeding: You may see some incision sites that are oozing through the bandages. This is not unusual    and can be managed with Rest, Ice, Compression and Elevation (RICE). Apply ice and firm pressure    directly to the site that is bleeding and rest with your leg(s) elevated above your heart for 20-30 minutes.    Post-Op Day One:  1. Medication   - Ibuprofen:  Continue the same as the Day of Your Procedure.  2. Activity   - Walk as tolerated. Resume your normal daily walking activities. If it hurts, stop. We encourage you to               walk. Elevate if possible when not walking.  3. Bandages and Compression   - After 24 hours, you may remove your compression hose to take a shower. Please keep your bandage(s)    on and intact. You may want to cover your  bandage(s) to ensure it remains dry during your shower.    Reapply your compression hose after your shower and wear during waking hours only.  4. Driving   - You may resume driving when you can do so safely.  Post-Op Day Two:   1. Medication  - Ibuprofen:  Continue the same as the Day of Your Procedure.  2.  Activity   - Walk as tolerated. Elevate if possible when not walking.  3. Bandages and Compression  - You may remove your bandage after 48 hours. Continue wearing your compression hose during waking hours only for a total of seven days following your procedure.  4. Incisions   - Your leg(s) may be bruised at and around the incision site(s). This is normal.  5. Call Us If:   - You see any areas on your leg that are red and angry in appearance.   - You notice any drainage that is milky or cloudy in appearance or that has a foul odor.   - You run a temperature of 100.5 or greater.    Post-Op Day Three:  Your follow up appointment is: ______________________________________________    At this appointment, you will have an ultrasound and we will check your incisions. If your doctor is not scheduled to be in the clinic at the time of your appointment, you will be seen by a different doctor or nurse.  __________________________________________________________________________________________    The Two Weeks Following Your Procedure  1.  Skin Care   - Do not use any lotions, creams or powders on your leg for 14 days or until the incisions have healed.   - Do not soak in a bathtub, whirlpool, or hot tub or go swimming for 14 days or until your incisions have  healed.  2.  Medications   - You may use ibuprofen or acetaminophen (e.g., Tylenol) as needed for pain or discomfort.  3.  Activity   - Do not lift over 25 pounds. After about two weeks you may resume exercise such as aerobics, running,    tennis or weight lifting. Use your common sense and ease back into your exercise routine slowly.   - You may feel a cord-like  tightness along the inside of your leg. Gentle stretching can be helpful.  4. Compression Hose   - Your doctor may instruct you to wear compression for longer than seven days; please    follow your doctor's instructions. As a comfort measure, you may choose to wear compression for    longer than required.  5.  Travel   - Do not fly in an airplane for 14 days after your procedure.  If you have a long car trip planned within    two to three weeks following your procedure, stop and walk for a few minutes every two hours.    Periodic ankle pumps during the ride may be helpful.    Six Week Appointment   At your six week appointment, you will see your surgeon for an exam and evaluation. This office visit    will be scheduled when you return for Post-op Day Three Return Appointment.     Return to Work  1.  If you work outside the home, you may return to work in a few days depending on the extent of your    procedure, how you tolerate it, and the type of work you perform.  2.  Paperwork: If your employer requires paperwork or you would like a letter written to your employer, please    let us know. We will complete disability type forms at no charge. Please allow five business days for    forms to be completed.     SCLEROTHERAPY AFTER CARE  Immediately:  After treatment, walk for 10-15 minutes before getting in your car.  If your trip home is more than 1 hour, stop and walk around for 5-10 minutes. Avoid sitting or standing for extended periods.   First 24 hours: Wear your compression continuously, even while in bed. After the 24 hours, you may shower if you want to. Put your hose back on, unless you are going to bed. You should NOT wear compression to bed after the first 24 hours. You may fly the next day, but wear your compression.   For 5 days: Wear the compression hose for waking hours only. You may continue to wear them longer than 5 days if you prefer.   For days 5-7: Walking is encouraged, as it promotes efficient  circulation in your veins. You may do activities that raise your heart rate, but do NOT run, jog, do high impact aerobics, or weight lifting. After 7 days, no activity restrictions.    Shaving: Wait a few days to shave or apply lotion.   Bathing: Do NOT take hot baths or sit in a hot tub for 7-10 days.    For 1 year: Wear SPF 30 sunscreen on your legs when in the sun. This is very important! It helps prevent darkening of the skin at the injection sites.   Medications: You may resume your usual medications, including aspirin or ibuprofen.    Common Things to Expect  -  Compression must be worn for the first 24 hours and then during the day for 5-7 days.    -  If larger veins are treated with ultrasound-guided sclerotherapy, you will have redness, firmness, tenderness, and swelling.  This firmness and tenderness may take 3-6 months to resolve. Ibuprofen and compression hose will aid in this process.    -  You will have bruising that can last up to 3 weeks. Most fading of the veins will occur between 3 and 6 weeks after treatment.    -  You may notice brown discoloration (hyperpigmentation) at the treatment site.  This should fade with time, but will take 3 months to 1 year to fully heal.     -  Some treated veins may look darker because of trapped blood within the vein. This trapped blood can be removed at a minimum of 1 month following treatment. Larger veins are more likely to develop trapped blood.    -  It is very important for you to use at least SPF 30 sunscreen in order to help prevent the discoloration of your skin.    -  Migraines rarely occur following sclerotherapy, but are more likely in patients with a history of migraines.  Treat as you would any other migraine.

## 2022-04-01 ENCOUNTER — OFFICE VISIT (OUTPATIENT)
Dept: VASCULAR SURGERY | Facility: CLINIC | Age: 52
End: 2022-04-01
Payer: COMMERCIAL

## 2022-04-01 VITALS — DIASTOLIC BLOOD PRESSURE: 76 MMHG | SYSTOLIC BLOOD PRESSURE: 131 MMHG | OXYGEN SATURATION: 98 % | HEART RATE: 83 BPM

## 2022-04-01 DIAGNOSIS — I83.812 VARICOSE VEINS OF LEFT LOWER EXTREMITY WITH PAIN: ICD-10-CM

## 2022-04-01 DIAGNOSIS — I78.1 SPIDER TELANGIECTASIS OF SKIN: ICD-10-CM

## 2022-04-01 DIAGNOSIS — I83.811 VARICOSE VEINS OF RIGHT LOWER EXTREMITY WITH PAIN: ICD-10-CM

## 2022-04-01 PROCEDURE — 36468 NJX SCLRSNT SPIDER VEINS: CPT | Performed by: SURGERY

## 2022-04-01 PROCEDURE — S9999 SALES TAX: HCPCS | Performed by: SURGERY

## 2022-04-01 PROCEDURE — 36470 NJX SCLRSNT 1 INCMPTNT VEIN: CPT | Mod: 50 | Performed by: SURGERY

## 2022-04-01 PROCEDURE — 36475 ENDOVENOUS RF 1ST VEIN: CPT | Mod: LT | Performed by: SURGERY

## 2022-04-01 NOTE — LETTER
4/1/2022         RE: Lisa Borden  [id number] [facility] [address]        Dear Colleague,    Thank you for referring your patient, Lisa Borden, to the [facility] [place]. Please see a copy of my visit note below.    Pre-procedure Nursing Note    Lisa Borden presents to clinic for Vein Procedure  .   /Person Responsible for Patient: BREA (son)  Phone Number: [phone]    Prophylactic Medication:N/A   Sedation Medication: Ativan, 3mg ,   Time Taken: 1300 and Clonidine, 0.1mg,   Time Taken: 1300  Compression Stockings: Patient brought  The procedure is being performed on BLE.  Patient understanding of procedure matches consent? YES    Patient's pre-procedure medications verified by Matt Bonilla RN  .    Matt Bonilla RN on 4/1/2022 at 1:08 PM        Vein Clinic Procedure Note    Indications:  1.  Recurrent, bilateral lower extremity varicose veins with pain; symptoms recalcitrant to conservative measures  2.  Bilateral extremity spider telangiectasias of cosmetic concern    Procedure:  1. Radiofrequency ablation left anterior accessory saphenous vein  2. Medically necessary sclerotherapy 1, symptomatic left anterior accessory saphenous vein tributary  3. Medically necessary sclerotherapy 1, symptomatic right pelvic source varicose vein  4. Bilateral lower extremity cosmetic sclerotherapy of spider telangiectasias    Procedure Description  Details of the procedure including risks of bleeding, infection, nerve injury, scarring, hyperpigmentation, deep vein thrombosis, recanalization of the left anterior accessory saphenous vein and recurrent varicose veins all discussed.  The patient voiced understanding and wished to proceed.  Informed consent was obtained.     I initialed the patient's left lower extremity marking the operative site with indelible marker.      Cosmetic sclerotherapy  I donned the Syris headlight and injected telangiectasias  primarily in clusters over the right lateral leg and left lateral leg as well as a few scattered over the lateral thighs and in the popliteal fossas using 0.5% polidocanol foam.  I had the patient perform ankle pumps.    We then proceeded the operating room, had the patient lie supine on the operating table, then prepped and draped the left lower extremity sterilely.    We took a timeout to confirm the appropriate operative site and procedure: Radiofrequency ablation of the left anterior accessory saphenous vein with bilateral lower extremity medically necessary sclerotherapy and (already performed) bilateral extremity cosmetic sclerotherapy    VNUS Closure  I imaged the left lower extremity easily identifying the left anterior accessory saphenous vein.  The vein measured about 5 cm in length before it broke through the fascia, therefore I needed to use the 3 cm length closure fast device.  I infiltrated the skin overlying the vein in the proximal third of the thigh, placed a micropuncture needle into the vein followed by a micropuncture guidewire and a 7 Bulgarian sheath.    We passed the closure fast device through the sheath, positioning of the tip of the device 2.43 centimeters from the saphenofemoral junction under ultrasound guidance with the operating table in Trendelenburg position.  Tumescent anesthetic was injected along the course of the great saphenous vein under ultrasound guidance with care to confirm catheter tip position prior to infiltrating the tissues in this location.    After allowing the block to take effect and after confirming appropriate position of the catheter, I applied compression to the left anterior thigh anterior accessory saphenous vein with the ultrasound probe treating the first 3 cm  increment with 2 RF sessions each.  This completed treatment of the vein.  The patient was comfortable throughout.    After completing the pullback, I reimaged the vein and noted that the vein was  noncompressible and closed.  The saphenofemoral junction and common femoral veins were fully compressible and free of thrombus.  This was documented.  The sheath and the catheter were removed and hemostasis secured with pressure.    Medically necessary sclerotherapy  I imaged the tributary coursing from the left anterior accessory saphenous vein near the mid thigh, directed a 27-gauge needle into it and injected 1% polidocanol foam.  We filled the vein nicely.  I imaged the vein just cephalad of the knee but it had already filled with foam, therefore could not be reinjected.  More distally, the vein was in tight spasm.    We dressed the access site on the left anterior thigh with a gauze and Tegaderm dressing.    I then imaged the right proximal thigh, locating the pelvic source vein coursing from near the right saphenofemoral junction.  The vein broke through the fascia near the mid thigh to become a varicose vein coursing down the thigh.  I accessed the vein approximately 12 cm from the saphenofemoral junction with a 27-gauge needle under ultrasound guidance and injected 1.5 mL of 1% polidocanol foam.  This sent the vein into tight spasm.  I imaged the vein more distally on the thigh but noted that it already filled with foam.  I had the patient perform ankle pumps.    The left leg was cleaned with saline solution.  We observed the patient for 30 minutes to ensure excellent hemostasis then took her  to her son's car in a wheelchair.  Post procedure instructions were given to the patient and her son in verbal and written form and their questions answered.    The patient tolerated the procedure well without evidence of allergic reaction or other complications and will return in 72 hours for a left lower extremity venous ultrasound.    Jojo Closure    Date/Time: 4/1/2022 3:10 PM  Performed by: Terry García MD  Authorized by: Terry García MD     Time out: Immediately prior to the procedure  a time out was called    Preparation: Patient was prepped and draped in usual sterile fashion    1st Assist: Tesha Cortes, CST/CSFA    Circulator: Veena Andrea RN and Matt Bonilla RN    Procedure:  VNUS  Procedure side:  Left  One Vein    Vein Treated:  ASV  Patient tolerance:  Patient tolerated the procedure well with no immediate complications  Sclerotherapy    Date/Time: 4/1/2022 3:11 PM  Performed by: Terry García MD  Authorized by: Terry García MD     Type:  Medically Necessary  Vein:  One Vein  Yes    Procedure side:  Left  Solution/Amount:  1% POLIDOCANOL  Syringes:  1  Patient tolerance:  Patient tolerated the procedure well with no immediate complications  Sclerotherapy    Date/Time: 4/1/2022 3:11 PM  Performed by: Terry García MD  Authorized by: Terry García MD     Type:  Medically Necessary  Vein:  One Vein  Yes    Procedure side:  Right  Solution/Amount:  1% POLIDOCANOL  Syringes:  1  Patient tolerance:  Patient tolerated the procedure well with no immediate complications  Sclerotherapy    Date/Time: 4/1/2022 3:11 PM  Performed by: Terry García MD  Authorized by: Terry García MD     Type:  Cosmetic  Session:  Full  Procedure side:  Bilateral  Solution/Amount:  .5 POLIDOCANOL  Syringes:  4  Patient tolerance:  Patient tolerated the procedure well with no immediate complications  Wrap/Hose:  Hose        Flowsheet Data 4/1/2022   Procedure Start Time:  2:21 PM   Prep: Chloraprep   Side: Left   Tx Length (cm): LT ASV: 3.5   Junction (cm): LT ASV: 2.43   RF Cycles: LT ASV: 2   RF TX Time (Minutes): LT ASV: 40 SECONDS   Sedation taken: Yes   Pre Pt. Physical / Cognitive Limitations: WNL   TOTAL Local anesthesia Injected (ml): 3.5   Max Volume Local Anesthesia (ml): 11   TOTAL Tumescent Injected volume (ml): 50   Max Volume Tumescent (ml): 286   Post Pt. Physical / Cognitive Limitations: WNL   Procedure End Time:  3:04  PM   D/C Instructions given, states readiness to leave and escorted to car: Yes       C Luís García MD    Dictated using Dragon voice recognition software which may result in transcription errors      Again, thank you for allowing me to participate in the care of your patient.        Sincerely,        Terry García MD

## 2022-04-01 NOTE — PROGRESS NOTES
Vein Clinic Procedure Note    Indications:  1.  Recurrent, bilateral lower extremity varicose veins with pain; symptoms recalcitrant to conservative measures  2.  Bilateral extremity spider telangiectasias of cosmetic concern    Procedure:  1. Radiofrequency ablation left anterior accessory saphenous vein  2. Medically necessary sclerotherapy 1, symptomatic left anterior accessory saphenous vein tributary  3. Medically necessary sclerotherapy 1, symptomatic right pelvic source varicose vein  4. Bilateral lower extremity cosmetic sclerotherapy of spider telangiectasias    Procedure Description  Details of the procedure including risks of bleeding, infection, nerve injury, scarring, hyperpigmentation, deep vein thrombosis, recanalization of the left anterior accessory saphenous vein and recurrent varicose veins all discussed.  The patient voiced understanding and wished to proceed.  Informed consent was obtained.     I initialed the patient's left lower extremity marking the operative site with indelible marker.      Cosmetic sclerotherapy  I donned the [employer] headlight and injected telangiectasias primarily in clusters over the right lateral leg and left lateral leg as well as a few scattered over the lateral thighs and in the popliteal fossas using 0.5% polidocanol foam.  I had the patient perform ankle pumps.    We then proceeded the operating room, had the patient lie supine on the operating table, then prepped and draped the left lower extremity sterilely.    We took a timeout to confirm the appropriate operative site and procedure: Radiofrequency ablation of the left anterior accessory saphenous vein with bilateral lower extremity medically necessary sclerotherapy and (already performed) bilateral extremity cosmetic sclerotherapy    VNUS Closure  I imaged the left lower extremity easily identifying the left anterior accessory saphenous vein.  The vein measured about 5 cm in length before it broke through the  fascia, therefore I needed to use the 3 cm length closure fast device.  I infiltrated the skin overlying the vein in the proximal third of the thigh, placed a micropuncture needle into the vein followed by a micropuncture guidewire and a 7 Israeli sheath.    We passed the closure fast device through the sheath, positioning of the tip of the device 2.43 centimeters from the saphenofemoral junction under ultrasound guidance with the operating table in Trendelenburg position.  Tumescent anesthetic was injected along the course of the great saphenous vein under ultrasound guidance with care to confirm catheter tip position prior to infiltrating the tissues in this location.    After allowing the block to take effect and after confirming appropriate position of the catheter, I applied compression to the left anterior thigh anterior accessory saphenous vein with the ultrasound probe treating the first 3 cm  increment with 2 RF sessions each.  This completed treatment of the vein.  The patient was comfortable throughout.    After completing the pullback, I reimaged the vein and noted that the vein was noncompressible and closed.  The saphenofemoral junction and common femoral veins were fully compressible and free of thrombus.  This was documented.  The sheath and the catheter were removed and hemostasis secured with pressure.    Medically necessary sclerotherapy  I imaged the tributary coursing from the left anterior accessory saphenous vein near the mid thigh, directed a 27-gauge needle into it and injected 1% polidocanol foam.  We filled the vein nicely.  I imaged the vein just cephalad of the knee but it had already filled with foam, therefore could not be reinjected.  More distally, the vein was in tight spasm.    We dressed the access site on the left anterior thigh with a gauze and Tegaderm dressing.    I then imaged the right proximal thigh, locating the pelvic source vein coursing from near the right saphenofemoral  junction.  The vein broke through the fascia near the mid thigh to become a varicose vein coursing down the thigh.  I accessed the vein approximately 12 cm from the saphenofemoral junction with a 27-gauge needle under ultrasound guidance and injected 1.5 mL of 1% polidocanol foam.  This sent the vein into tight spasm.  I imaged the vein more distally on the thigh but noted that it already filled with foam.  I had the patient perform ankle pumps.    The left leg was cleaned with saline solution.  We observed the patient for 30 minutes to ensure excellent hemostasis then took her  to her son's car in a wheelchair.  Post procedure instructions were given to the patient and her son in verbal and written form and their questions answered.    The patient tolerated the procedure well without evidence of allergic reaction or other complications and will return in 72 hours for a left lower extremity venous ultrasound.    Jojo Closure    Date/Time: 4/1/2022 3:10 PM  Performed by: Terry García MD  Authorized by: Terry García MD     Time out: Immediately prior to the procedure a time out was called    Preparation: Patient was prepped and draped in usual sterile fashion    1st Assist: Tesha Cortes, CST/CSFA    Circulator: Veena Andrea RN and Matt Bonilla RN    Procedure:  VNUS  Procedure side:  Left  One Vein    Vein Treated:  ASV  Patient tolerance:  Patient tolerated the procedure well with no immediate complications  Sclerotherapy    Date/Time: 4/1/2022 3:11 PM  Performed by: Terry García MD  Authorized by: Teryr García MD     Type:  Medically Necessary  Vein:  One Vein  Yes    Procedure side:  Left  Solution/Amount:  1% POLIDOCANOL  Syringes:  1  Patient tolerance:  Patient tolerated the procedure well with no immediate complications  Sclerotherapy    Date/Time: 4/1/2022 3:11 PM  Performed by: Terry García MD  Authorized by: Terry García  MD Luís     Type:  Medically Necessary  Vein:  One Vein  Yes    Procedure side:  Right  Solution/Amount:  1% POLIDOCANOL  Syringes:  1  Patient tolerance:  Patient tolerated the procedure well with no immediate complications  Sclerotherapy    Date/Time: 4/1/2022 3:11 PM  Performed by: Terry García MD  Authorized by: Terry García MD     Type:  Cosmetic  Session:  Full  Procedure side:  Bilateral  Solution/Amount:  .5 POLIDOCANOL  Syringes:  4  Patient tolerance:  Patient tolerated the procedure well with no immediate complications  Wrap/Hose:  Hose        Flowsheet Data 4/1/2022   Procedure Start Time:  2:21 PM   Prep: Chloraprep   Side: Left   Tx Length (cm): LT ASV: 3.5   Junction (cm): LT ASV: 2.43   RF Cycles: LT ASV: 2   RF TX Time (Minutes): LT ASV: 40 SECONDS   Sedation taken: Yes   Pre Pt. Physical / Cognitive Limitations: WNL   TOTAL Local anesthesia Injected (ml): 3.5   Max Volume Local Anesthesia (ml): 11   TOTAL Tumescent Injected volume (ml): 50   Max Volume Tumescent (ml): 286   Post Pt. Physical / Cognitive Limitations: WNL   Procedure End Time:  3:04 PM   D/C Instructions given, states readiness to leave and escorted to car: Yes       NEVILLE García MD    Dictated using Dragon voice recognition software which may result in transcription errors

## 2022-04-01 NOTE — PROGRESS NOTES
Pre-procedure Nursing Note    Lisa Borden presents to clinic for Vein Procedure  .   /Person Responsible for Patient: BREA (son)  Phone Number: [phone]    Prophylactic Medication:N/A   Sedation Medication: Ativan, 3mg ,   Time Taken: 1300 and Clonidine, 0.1mg,   Time Taken: 1300  Compression Stockings: Patient brought  The procedure is being performed on BLE.  Patient understanding of procedure matches consent? YES    Patient's pre-procedure medications verified by Matt Bonilla RN  .    Matt Bonilla RN on 4/1/2022 at 1:08 PM

## 2022-04-04 ENCOUNTER — OFFICE VISIT (OUTPATIENT)
Dept: VASCULAR SURGERY | Facility: CLINIC | Age: 52
End: 2022-04-04
Attending: SURGERY
Payer: COMMERCIAL

## 2022-04-04 ENCOUNTER — ANCILLARY PROCEDURE (OUTPATIENT)
Dept: ULTRASOUND IMAGING | Facility: CLINIC | Age: 52
End: 2022-04-04
Attending: SURGERY
Payer: COMMERCIAL

## 2022-04-04 DIAGNOSIS — I83.812 VARICOSE VEINS OF LEFT LOWER EXTREMITY WITH PAIN: ICD-10-CM

## 2022-04-04 DIAGNOSIS — Z09 POSTOP CHECK: Primary | ICD-10-CM

## 2022-04-04 PROCEDURE — 99207 PR NO CHARGE NURSE ONLY: CPT

## 2022-04-04 PROCEDURE — 93971 EXTREMITY STUDY: CPT | Mod: LT | Performed by: SURGERY

## 2022-04-04 NOTE — LETTER
4/4/2022         RE: Lisa Borden  [address]  [place] [address]        Dear Colleague,    Thank you for referring your patient, Lisa Borden, to the [facility] VEIN [facility] [place]. Please see a copy of my visit note below.        Vein Clinic Postoperative Nurse Note    Patient is here for their 72 hour postoperative visit.    Procedure: Left leg VNUS closure ASV(med nec), u/s sclero (med nec) 1/2 sessions (exp. 11/30/22), sclero ($)  Procedure Date: 4/1/22  Surgeon: Dr. García    Ultrasound Result: The left lower extremity is negative for a DVT. The AASV is closed 10.7 mm from the SFJ to the upper thigh with evidence of thrombus throughout.    Physical Exam: Incisions are approximated without signs of infection.  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 She is on vacation this week (4/4-4/8) but resumes care on 4/11. Reminded her of activity restrictions, no lifting over 25 lbs, for 2 weeks from 4/1. Patient stated she will plan to change their diapers on the floor to avoid any heavy lifting and voiced understanding.     Reviewed postoperative instructions with patient and provided them with written material of common things to expect from their procedure.     Patient's Next Vein Clinic Appointment: 6/14/22 6 week post op follow up with Dr. García and possible 2/2 session ultrasound guided sclerotherapy    Paula Eden RN      Again, thank you for allowing me to participate in the care of your patient.        Sincerely,         Vein Nurse

## 2022-04-04 NOTE — PROGRESS NOTES
Vein Clinic Postoperative Nurse Note    Patient is here for their 72 hour postoperative visit.    Procedure: Left leg VNUS closure ASV(med nec), u/s sclero (med nec) 1/2 sessions (exp. 11/30/22), sclero ($)  Procedure Date: 4/1/22  Surgeon: Dr. García    Ultrasound Result: The left lower extremity is negative for a DVT. The AASV is closed 10.7 mm from the SFJ to the upper thigh with evidence of thrombus throughout.    Physical Exam: Incisions are approximated without signs of infection.  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 She is on vacation this week (4/4-4/8) but resumes care on 4/11. Reminded her of activity restrictions, no lifting over 25 lbs, for 2 weeks from 4/1. Patient stated she will plan to change their diapers on the floor to avoid any heavy lifting and voiced understanding.     Reviewed postoperative instructions with patient and provided them with written material of common things to expect from their procedure.     Patient's Next Vein Clinic Appointment: 6/14/22 6 week post op follow up with Dr. García and possible 2/2 session ultrasound guided sclerotherapy    Paula Eden RN  
Yes

## 2022-04-04 NOTE — PATIENT INSTRUCTIONS
Vein Closure (Ablation)  Common Things to Expect    - A small lump may develop at the site(s) where the vein closure device was inserted. This is a normal step in healing. These should not be painful, but may be tender to the touch. It can take 6 weeks to 3 months for the lumps/firmness to resolve.   - Bruising will look worse before it looks better and can last for 4-6 weeks.  - After about 10 days, you may notice tightness/pulling on the inside thigh and knee. As your ablated vein is healing, it contracts, causing a tightness or pulling sensation. This may last for several weeks, but will resolve. Treat it with Ibuprofen or Advil.  - Numbness will get better with time, but may take 3 months to a year to resolve.  - You may notice that the skin on your legs has become ultra-sensitive to touch. For example, the weight of your sheets may feel painful. This usually resolves in 6 weeks.  - Ankle swelling is not uncommon and may last 4-6 weeks.   - To get optimal results from your procedure, wearing your compression hose is key for the first 7 days. This is necessary to ensure proper closure of the ablated vein.    - For 2 weeks, no weight lifting over 25lbs, no running, and no vigorous aerobic exercise. After this time, ease back into your normal activities. If you do too much too soon, you will have more pain and bruising and possibly re-open the vein that was closed. It takes about 2 weeks for the ablated vein to permanently close. Keep in mind your body is still healing.  - For 2 weeks, do not shave your legs or use lotions, powders, creams to allow proper healing of vein access sites.      If you are experiencing any of the following symptoms, please seek immediate medical attention at your local emergency department.  - Significant pain in the back of the calf possibly with difficulty walking  - Significant swelling and/or tenderness in the back of the calf  - Redness that continues to spread  - Chest pain and/or  shortness of breath  SCLEROTHERAPY AFTER CARE  Immediately:  After treatment, walk for 10-15 minutes before getting in your car.  If your trip home is more than 1 hour, stop and walk around for 5-10 minutes. Avoid sitting or standing for extended periods.   First 24 hours: Wear your compression continuously, even while in bed. After the 24 hours, you may shower if you want to. Put your hose back on, unless you are going to bed. You should NOT wear compression to bed after the first 24 hours. You may fly the next day, but wear your compression.   For 5 days: Wear the compression hose for waking hours only. You may continue to wear them longer than 5 days if you prefer.   For days 5-7: Walking is encouraged, as it promotes efficient circulation in your veins. You may do activities that raise your heart rate, but do NOT run, jog, do high impact aerobics, or weight lifting. After 7 days, no activity restrictions.    Shaving: Wait a few days to shave or apply lotion.   Bathing: Do NOT take hot baths or sit in a hot tub for 7-10 days.    For 1 year: Wear SPF 30 sunscreen on your legs when in the sun. This is very important! It helps prevent darkening of the skin at the injection sites.   Medications: You may resume your usual medications, including aspirin or ibuprofen.    Common Things to Expect  -  Compression must be worn for the first 24 hours and then during the day for 5-7 days.    -  If larger veins are treated with ultrasound-guided sclerotherapy, you will have redness, firmness, tenderness, and swelling.  This firmness and tenderness may take 3-6 months to resolve. Ibuprofen and compression hose will aid in this process.    -  You will have bruising that can last up to 3 weeks. Most fading of the veins will occur between 3 and 6 weeks after treatment.    -  You may notice brown discoloration (hyperpigmentation) at the treatment site.  This should fade with time, but will take 3 months to 1 year to fully heal.      -  Some treated veins may look darker because of trapped blood within the vein. This trapped blood can be removed at a minimum of 1 month following treatment. Larger veins are more likely to develop trapped blood.    -  It is very important for you to use at least SPF 30 sunscreen in order to help prevent the discoloration of your skin.    -  Migraines rarely occur following sclerotherapy, but are more likely in patients with a history of migraines.  Treat as you would any other migraine.

## 2022-06-14 ENCOUNTER — OFFICE VISIT (OUTPATIENT)
Dept: VASCULAR SURGERY | Facility: CLINIC | Age: 52
End: 2022-06-14
Payer: COMMERCIAL

## 2022-06-14 DIAGNOSIS — I83.811 VARICOSE VEINS OF RIGHT LOWER EXTREMITY WITH PAIN: ICD-10-CM

## 2022-06-14 DIAGNOSIS — Z09 POSTOP CHECK: Primary | ICD-10-CM

## 2022-06-14 DIAGNOSIS — I83.812 VARICOSE VEINS OF LEFT LOWER EXTREMITY WITH PAIN: ICD-10-CM

## 2022-06-14 PROCEDURE — 36471 NJX SCLRSNT MLT INCMPTNT VN: CPT | Mod: LT | Performed by: SURGERY

## 2022-06-14 PROCEDURE — 76942 ECHO GUIDE FOR BIOPSY: CPT | Performed by: SURGERY

## 2022-06-14 NOTE — LETTER
6/14/2022         RE: Lisa Borden  [address] [place] [facility] [address]        Dear Colleague,    Thank you for referring your patient, Lisa Borden, to the [facility] VEIN CLIN[facility] [place]. Please see a copy of my visit note below.        Vein Clinic Sclerotherapy Note     Indications:  Residual left lower extremity pain with varicose veins; status post radiofrequency ablation left anterior accessory saphenous vein with medically necessary sclerotherapy left anterior accessory saphenous vein tributaries and right lower extremity pelvic source varicose veins     Procedure:  1.  Ultrasound-guided, medically necessary sclerotherapy residual, symptomatic left anterior accessory saphenous vein tributaries  2.  Ultrasound-guided, medically necessary sclerotherapy left lower extremity pelvic source varicose veins     Procedure description  Details of procedure including risks of allergic reaction, deep vein thrombosis, ulceration, superficial thrombophlebitis and hyperpigmentation were discussed.  The patient voiced understanding and wished to proceed.  Informed consent was obtained.    The patient was still complaining of pain in the mid posteromedial left thigh and the proximal lateral left leg but had gotten good relief of the discomfort on the left anterior thigh and the right thigh.    There was some trapped blood on the left anterior and anterolateral thigh down to the mid thigh with few areas of trapped blood on the lateral left leg.    I imaged the left anterior thigh and noted that the left anterior accessory saphenous vein was noncompressible and the large tributary coursing from it was also noncompressible down to the distal third of the lateral left thigh.    I then imaged the proximal medial left thigh and noted a large pelvic source varicosity on the posteromedial thigh which was the varicosity coursing down the posterior thigh in the area of her complaints and also was supplying  the varicosity on the lateral left leg as it coursed laterally across the distal posterior thigh to the lateral left leg.    I isolated the color source varicose vein in the left proximal posterior medial thigh with ultrasound, directed a 27-gauge needle into it and injected 2.5 mL of 1% polidocanol foam.  I think it is the vein near the mid thigh and injected it once again with 1 mL of 1% polidocanol foam.    I then had the patient turned prone and imaged the pelvic source vein in the distal lateral left thigh, just cephalad of the popliteal crease, and directed a 27-gauge needle into it injecting it with 1% polidocanol foam.  Finally, I injected the varicosity just distal to the popliteal crease laterally under ultrasound guidance and then followed with a bit more distally and injected once again filling the vein in its entirety.  Used a total of 6 mL of 1% polidocanol foam.    It had the patient turned supine once again and I injected the varicosity on the distal limb lateral left thigh, slightly more anterior and then a second time just lateral to the left knee, traveling from the anterior excessively saphenous vein varicosity.  I used 0.5% polidocanol foam for these last 2 injections, total of 3 mL.    I had the patient perform ankle pumps.  I cleaned the areas of trapped blood with alcohol, made stab wounds with an 18-gauge needle and expressed thrombus.    We then cleaned her legs, had her don compression, then had a walk for 10 minutes.  She will return in 4 to 6 weeks in follow-up with no treatment planned but in the ultrasound room to ensure that this large left lower extremity pelvic source varicose vein is closed, that her symptoms are related that there is no significant trapped blood.    Sclerotherapy    Date/Time: 6/14/2022 4:37 PM  Performed by: Terry García MD  Authorized by: Terry García MD     Time out: Immediately prior to the procedure a time out was called    Type:   Medically Necessary  Vein:  Multiple Veins  Yes    Procedure side:  Left  Solution/Amount:  1% POLIDOCANOL and .5 POLIDOCANOL  Syringes:  2 syringes (6 mL) 1% polidocanol foam; 1 syringe 0.5% (3 mL) polidocanol foam  Evacuation of intraluminal thrombus under sterile conditions without complications.    Patient tolerance:  Patient tolerated the procedure well with no immediate complications  Wrap/Hose:  Rufus García MD    Dictated using Dragon voice recognition software which may result in transcription errors      Again, thank you for allowing me to participate in the care of your patient.        Sincerely,        Terry García MD

## 2022-06-14 NOTE — PROGRESS NOTES
Vein Clinic Sclerotherapy Note     Indications:  Residual left lower extremity pain with varicose veins; status post radiofrequency ablation left anterior accessory saphenous vein with medically necessary sclerotherapy left anterior accessory saphenous vein tributaries and right lower extremity pelvic source varicose veins     Procedure:  1.  Ultrasound-guided, medically necessary sclerotherapy residual, symptomatic left anterior accessory saphenous vein tributaries  2.  Ultrasound-guided, medically necessary sclerotherapy left lower extremity pelvic source varicose veins     Procedure description  Details of procedure including risks of allergic reaction, deep vein thrombosis, ulceration, superficial thrombophlebitis and hyperpigmentation were discussed.  The patient voiced understanding and wished to proceed.  Informed consent was obtained.    The patient was still complaining of pain in the mid posteromedial left thigh and the proximal lateral left leg but had gotten good relief of the discomfort on the left anterior thigh and the right thigh.    There was some trapped blood on the left anterior and anterolateral thigh down to the mid thigh with few areas of trapped blood on the lateral left leg.    I imaged the left anterior thigh and noted that the left anterior accessory saphenous vein was noncompressible and the large tributary coursing from it was also noncompressible down to the distal third of the lateral left thigh.    I then imaged the proximal medial left thigh and noted a large pelvic source varicosity on the posteromedial thigh which was the varicosity coursing down the posterior thigh in the area of her complaints and also was supplying the varicosity on the lateral left leg as it coursed laterally across the distal posterior thigh to the lateral left leg.    I isolated the pelvic source varicose vein in the left proximal posterior medial thigh with ultrasound, directed a 27-gauge needle into it  and injected 2.5 mL of 1% polidocanol foam.  I imaged the vein near the mid thigh and injected it once again with 1 mL of 1% polidocanol foam.    I then had the patient turned prone and imaged the pelvic source vein in the distal lateral left thigh, just cephalad of the popliteal crease, and directed a 27-gauge needle into it injecting it with 1% polidocanol foam.  Finally, I injected the varicosity just distal to the popliteal crease laterally under ultrasound guidance and then followed with a bit more distally and injected once again filling the vein in its entirety.  Used a total of 6 mL of 1% polidocanol foam.    It had the patient turned supine once again and I injected the varicosity on the distal limb lateral left thigh, slightly more anterior and then a second time just lateral to the left knee, traveling from the anterior excessively saphenous vein varicosity.  I used 0.5% polidocanol foam for these last 2 injections, total of 3 mL.    I had the patient perform ankle pumps.  I cleaned the areas of trapped blood with alcohol, made stab wounds with an 18-gauge needle and expressed thrombus.    We then cleaned her legs, had her don compression, then had a walk for 10 minutes.  She will return in 4 to 6 weeks in follow-up with no treatment planned but in the ultrasound room to ensure that this large left lower extremity pelvic source varicose vein is closed, that her symptoms are related that there is no significant trapped blood.    Sclerotherapy    Date/Time: 6/14/2022 4:37 PM  Performed by: Terry García MD  Authorized by: Terry García MD     Time out: Immediately prior to the procedure a time out was called    Type:  Medically Necessary  Vein:  Multiple Veins  Yes    Procedure side:  Left  Solution/Amount:  1% POLIDOCANOL and .5 POLIDOCANOL  Syringes:  2 syringes (6 mL) 1% polidocanol foam; 1 syringe 0.5% (3 mL) polidocanol foam  Evacuation of intraluminal thrombus under sterile  conditions without complications.    Patient tolerance:  Patient tolerated the procedure well with no immediate complications  Wrap/Hose:  Rufus García MD    Dictated using Dragon voice recognition software which may result in transcription errors

## 2022-07-07 ENCOUNTER — TELEPHONE (OUTPATIENT)
Dept: VASCULAR SURGERY | Facility: CLINIC | Age: 52
End: 2022-07-07

## 2022-07-07 NOTE — TELEPHONE ENCOUNTER
Vein Clinic - Nurse Triage Call    Situation: Pt calling c/o left leg behind and lateral area of knee. She states she still has pain and bulging and wants this treated.     Background: Pt had vein procedure: Left leg U/S guided sclerotherapy (med nec) 6/14/2022 (this was 2 of 2 sessions)    Assessment: Pt states has had 2 of 2 sessions of U/S guided sclerotherapy. She states her left leg behind her knee is unchanged. Denies any redness but is pain and bulging persist.     Recommendation: Pt states she is losing her insurance soon and if she needs more treatment (pending insurance approval) she would like to be seen sooner. Appt made for 7/15/2022. U/S guided sclerotherapy recheck for 15 min.       Patient is in agreement with plan and had no further questions.      Matt Bonilla RN

## 2022-07-15 ENCOUNTER — OFFICE VISIT (OUTPATIENT)
Dept: VASCULAR SURGERY | Facility: CLINIC | Age: 52
End: 2022-07-15
Payer: COMMERCIAL

## 2022-07-15 DIAGNOSIS — I83.812 VARICOSE VEINS OF LEFT LOWER EXTREMITY WITH PAIN: Primary | ICD-10-CM

## 2022-07-15 PROCEDURE — 99213 OFFICE O/P EST LOW 20 MIN: CPT | Performed by: SURGERY

## 2022-07-15 NOTE — PROGRESS NOTES
[facility] [place] sclerotherapy follow-up note  I performed ultrasound-guided sclerotherapy only some Michi's left proximal medial thigh pelvic source varicose veins which coursed down the posterior thigh, lateral to the left knee and onto the lateral left leg on 6/14/2022.  Overall she is pleased with pain relief but states that she has tenderness and firmness on the distal lateral left thigh, lateral to the left vein on the proximal lateral left calf.    Exam  She has induration from trapped blood in the varicosities on the distal lateral left thigh, lateral to the left knee and onto the posterolateral proximal left calf.  These areas were cleaned with alcohol, stab wounds made with an 18-gauge needle and thrombus expressed.    We cleaned the areas, placed cotton balls.    A/P  Good result following medically necessary sclerotherapy for pelvic source varicose veins.  I do not feel that further sclerotherapy is indicated.  She will return on an as-needed basis.    TERESITA García    Dictated using Dragon voice recognition software which may result in transcription errors

## 2022-07-15 NOTE — LETTER
7/15/2022         RE: Lisa Borden  [address]  [place] [id number]        Dear Colleague,    Thank you for referring your patient, Lisa Borden, to the [facility] [place]. Please see a copy of my visit note below.    [facility] sclerotherapy follow-up note  I performed ultrasound-guided sclerotherapy only some Michi's left proximal medial thigh pelvic source varicose veins which coursed down the posterior thigh, lateral to the left knee and onto the lateral left leg on 6/14/2022.  Overall she is pleased with pain relief but states that she has tenderness and firmness on the distal lateral left thigh, lateral to the left vein on the proximal lateral left calf.    Exam  She has induration from trapped blood in the varicosities on the distal lateral left thigh, lateral to the left knee and onto the posterolateral proximal left calf.  These areas were cleaned with alcohol, stab wounds made with an 18-gauge needle and thrombus expressed.    We cleaned the areas, placed cotton balls.    A/P  Good result following medically necessary sclerotherapy for pelvic source varicose veins.  I do not feel that further sclerotherapy is indicated.  She will return on an as-needed basis.    TERESITA García    Dictated using Dragon voice recognition software which may result in transcription errors      Again, thank you for allowing me to participate in the care of your patient.        Sincerely,        Terry García MD

## 2022-10-11 ENCOUNTER — HOSPITAL ENCOUNTER (OUTPATIENT)
Dept: MAMMOGRAPHY | Facility: CLINIC | Age: 52
Discharge: HOME | End: 2022-10-11
Attending: FAMILY MEDICINE | Admitting: FAMILY MEDICINE
Payer: COMMERCIAL

## 2022-10-11 ENCOUNTER — LAB REQUISITION (OUTPATIENT)
Dept: LAB | Facility: CLINIC | Age: 52
End: 2022-10-11
Payer: COMMERCIAL

## 2022-10-11 ENCOUNTER — ANCILLARY PROCEDURE (OUTPATIENT)
Dept: ULTRASOUND IMAGING | Facility: CLINIC | Age: 52
End: 2022-10-11
Attending: SURGERY
Payer: COMMERCIAL

## 2022-10-11 ENCOUNTER — OFFICE VISIT (OUTPATIENT)
Dept: VASCULAR SURGERY | Facility: CLINIC | Age: 52
End: 2022-10-11
Payer: COMMERCIAL

## 2022-10-11 ENCOUNTER — LAB REQUISITION (OUTPATIENT)
Dept: LAB | Facility: CLINIC | Age: 52
End: 2022-10-11

## 2022-10-11 DIAGNOSIS — Z11.4 ENCOUNTER FOR SCREENING FOR HUMAN IMMUNODEFICIENCY VIRUS (HIV): ICD-10-CM

## 2022-10-11 DIAGNOSIS — I83.812 VARICOSE VEINS OF LEFT LOWER EXTREMITY WITH PAIN: ICD-10-CM

## 2022-10-11 DIAGNOSIS — Z12.31 BREAST CANCER SCREENING BY MAMMOGRAM: ICD-10-CM

## 2022-10-11 DIAGNOSIS — Z09 POSTOP CHECK: ICD-10-CM

## 2022-10-11 DIAGNOSIS — I83.811 VARICOSE VEINS OF RIGHT LOWER EXTREMITY WITH PAIN: ICD-10-CM

## 2022-10-11 DIAGNOSIS — A53.9 SYPHILIS, UNSPECIFIED: ICD-10-CM

## 2022-10-11 DIAGNOSIS — Z11.59 ENCOUNTER FOR SCREENING FOR OTHER VIRAL DISEASES: ICD-10-CM

## 2022-10-11 DIAGNOSIS — I83.812 VARICOSE VEINS OF LEFT LOWER EXTREMITY WITH PAIN: Primary | ICD-10-CM

## 2022-10-11 PROCEDURE — 77067 SCR MAMMO BI INCL CAD: CPT

## 2022-10-11 PROCEDURE — 87389 HIV-1 AG W/HIV-1&-2 AB AG IA: CPT | Performed by: OBSTETRICS & GYNECOLOGY

## 2022-10-11 PROCEDURE — 86803 HEPATITIS C AB TEST: CPT | Mod: ORL | Performed by: OBSTETRICS & GYNECOLOGY

## 2022-10-11 PROCEDURE — 93971 EXTREMITY STUDY: CPT | Mod: LT | Performed by: SURGERY

## 2022-10-11 PROCEDURE — 87340 HEPATITIS B SURFACE AG IA: CPT | Performed by: OBSTETRICS & GYNECOLOGY

## 2022-10-11 PROCEDURE — 86780 TREPONEMA PALLIDUM: CPT | Mod: ORL | Performed by: OBSTETRICS & GYNECOLOGY

## 2022-10-11 PROCEDURE — 99213 OFFICE O/P EST LOW 20 MIN: CPT | Performed by: SURGERY

## 2022-10-11 NOTE — PROGRESS NOTES
[facility] [place] Progress Note    Lisa Borden presents in follow-up of radiofrequency ablation of her left anterior accessory saphenous vein with medically necessary sclerotherapy of left anterior accessory saphenous vein tributaries.  At the same setting, we treated recurrent, right medial thigh varicose veins with ultrasound-guided sclerotherapy.  She returned on 6/14/2022 where we performed additional sclerotherapy of left anterior sensory saphenous vein tributaries and left lower extremity pelvic source varicose veins which coursed down the posterior left thigh onto the lateral left leg.    The patient reports that she is doing very well, has no pain in that the veins are no longer present.  She is very happy with results.    Physical Exam  General: Pleasant female in no acute distress  Extremities: Right lower extremity: No residual varicose veins, stasis changes or edema.  No hyperpigmentation.    Left lower extremity: No residual varicose veins, hyperpigmentation or edema.    Ultrasound:  The left anterior sensory saphenous vein is closed 8.7 mm from saphenofemoral junction to the upper thigh.  No evidence of deep vein thrombosis.    Assessment:  Overall excellent result symptomatically and cosmetically.  The patient asked if she will ever have recurrent varicose veins and I was chico in discussing that 30 to 50% of patients will develop recurrent varicose veins.  She exercises regularly, is controlling her weight and these are the most important factors in lowering the likelihood of recurrence.    Plan:  Return on an as-needed basis.    Terry García MD    Dictated using Dragon voice recognition software which may result in transcription errors

## 2022-10-11 NOTE — LETTER
10/11/2022         RE: Lisa Borden  [address] [facility] [address]        Dear Colleague,    Thank you for referring your patient, Lisa Borden, to the [facility] [place]. Please see a copy of my visit note below.    [facility] [place] Progress Note    Lisa Borden presents in follow-up of radiofrequency ablation of her left anterior accessory saphenous vein with medically necessary sclerotherapy of left anterior accessory saphenous vein tributaries.  At the same setting, we treated recurrent, right medial thigh varicose veins with ultrasound-guided sclerotherapy.  She returned on 6/14/2022 where we performed additional sclerotherapy of left anterior sensory saphenous vein tributaries and left lower extremity pelvic source varicose veins which coursed down the posterior left thigh onto the lateral left leg.    The patient reports that she is doing very well, has no pain in that the veins are no longer present.  She is very happy with results.    Physical Exam  General: Pleasant female in no acute distress  Extremities: Right lower extremity: No residual varicose veins, stasis changes or edema.  No hyperpigmentation.    Left lower extremity: No residual varicose veins, hyperpigmentation or edema.    Ultrasound:  The left anterior sensory saphenous vein is closed 8.7 mm from saphenofemoral junction to the upper thigh.  No evidence of deep vein thrombosis.    Assessment:  Overall excellent result symptomatically and cosmetically.  The patient asked if she will ever have recurrent varicose veins and I was chico in discussing that 30 to 50% of patients will develop recurrent varicose veins.  She exercises regularly, is controlling her weight and these are the most important factors in lowering the likelihood of recurrence.    Plan:  Return on an as-needed basis.    Terry García MD    Dictated using Dragon voice recognition software  which may result in transcription errors          Again, thank you for allowing me to participate in the care of your patient.        Sincerely,        Terry García MD

## 2022-10-12 LAB
HBV SURFACE AG SERPL QL IA: NONREACTIVE
HCV AB SERPL QL IA: NONREACTIVE
HIV 1+2 AB+HIV1 P24 AG SERPL QL IA: NONREACTIVE
T PALLIDUM AB SER QL: NONREACTIVE

## 2022-10-17 LAB — HOLD SPECIMEN: NORMAL

## 2024-12-31 ENCOUNTER — ANCILLARY PROCEDURE (OUTPATIENT)
Dept: MAMMOGRAPHY | Facility: CLINIC | Age: 54
End: 2024-12-31

## 2024-12-31 DIAGNOSIS — Z12.31 VISIT FOR SCREENING MAMMOGRAM: ICD-10-CM

## 2024-12-31 DIAGNOSIS — Z12.31 ENCOUNTER FOR SCREENING MAMMOGRAM FOR BREAST CANCER: ICD-10-CM

## 2024-12-31 PROCEDURE — 77067 SCR MAMMO BI INCL CAD: CPT | Mod: TC | Performed by: RADIOLOGY

## 2024-12-31 PROCEDURE — 77063 BREAST TOMOSYNTHESIS BI: CPT | Mod: TC | Performed by: RADIOLOGY
